# Patient Record
Sex: FEMALE | Race: WHITE | NOT HISPANIC OR LATINO | URBAN - METROPOLITAN AREA
[De-identification: names, ages, dates, MRNs, and addresses within clinical notes are randomized per-mention and may not be internally consistent; named-entity substitution may affect disease eponyms.]

---

## 2024-06-28 ENCOUNTER — OUTPATIENT (OUTPATIENT)
Dept: OUTPATIENT SERVICES | Facility: HOSPITAL | Age: 49
LOS: 1 days | End: 2024-06-28
Payer: COMMERCIAL

## 2024-06-28 VITALS
HEIGHT: 63.5 IN | DIASTOLIC BLOOD PRESSURE: 60 MMHG | OXYGEN SATURATION: 98 % | WEIGHT: 141.32 LBS | SYSTOLIC BLOOD PRESSURE: 100 MMHG | TEMPERATURE: 98 F | HEART RATE: 57 BPM | RESPIRATION RATE: 20 BRPM

## 2024-06-28 DIAGNOSIS — Z86.39 PERSONAL HISTORY OF OTHER ENDOCRINE, NUTRITIONAL AND METABOLIC DISEASE: ICD-10-CM

## 2024-06-28 DIAGNOSIS — Z13.89 ENCOUNTER FOR SCREENING FOR OTHER DISORDER: ICD-10-CM

## 2024-06-28 DIAGNOSIS — Z90.89 ACQUIRED ABSENCE OF OTHER ORGANS: Chronic | ICD-10-CM

## 2024-06-28 DIAGNOSIS — Z01.818 ENCOUNTER FOR OTHER PREPROCEDURAL EXAMINATION: ICD-10-CM

## 2024-06-28 DIAGNOSIS — D28.0 BENIGN NEOPLASM OF VULVA: ICD-10-CM

## 2024-06-28 DIAGNOSIS — Z98.891 HISTORY OF UTERINE SCAR FROM PREVIOUS SURGERY: Chronic | ICD-10-CM

## 2024-06-28 DIAGNOSIS — Z98.890 OTHER SPECIFIED POSTPROCEDURAL STATES: Chronic | ICD-10-CM

## 2024-06-28 DIAGNOSIS — Z29.9 ENCOUNTER FOR PROPHYLACTIC MEASURES, UNSPECIFIED: ICD-10-CM

## 2024-06-28 LAB
A1C WITH ESTIMATED AVERAGE GLUCOSE RESULT: 5.2 % — SIGNIFICANT CHANGE UP (ref 4–5.6)
ANION GAP SERPL CALC-SCNC: 11 MMOL/L — SIGNIFICANT CHANGE UP (ref 5–17)
APTT BLD: 33.3 SEC — SIGNIFICANT CHANGE UP (ref 24.5–35.6)
BASOPHILS # BLD AUTO: 0.04 K/UL — SIGNIFICANT CHANGE UP (ref 0–0.2)
BASOPHILS NFR BLD AUTO: 0.5 % — SIGNIFICANT CHANGE UP (ref 0–2)
BUN SERPL-MCNC: 18.9 MG/DL — SIGNIFICANT CHANGE UP (ref 8–20)
CALCIUM SERPL-MCNC: 9.3 MG/DL — SIGNIFICANT CHANGE UP (ref 8.4–10.5)
CHLORIDE SERPL-SCNC: 98 MMOL/L — SIGNIFICANT CHANGE UP (ref 96–108)
CO2 SERPL-SCNC: 27 MMOL/L — SIGNIFICANT CHANGE UP (ref 22–29)
CREAT SERPL-MCNC: 0.8 MG/DL — SIGNIFICANT CHANGE UP (ref 0.5–1.3)
EGFR: 91 ML/MIN/1.73M2 — SIGNIFICANT CHANGE UP
EOSINOPHIL # BLD AUTO: 0.31 K/UL — SIGNIFICANT CHANGE UP (ref 0–0.5)
EOSINOPHIL NFR BLD AUTO: 3.6 % — SIGNIFICANT CHANGE UP (ref 0–6)
ESTIMATED AVERAGE GLUCOSE: 103 MG/DL — SIGNIFICANT CHANGE UP (ref 68–114)
GLUCOSE SERPL-MCNC: 91 MG/DL — SIGNIFICANT CHANGE UP (ref 70–99)
HCG SERPL-ACNC: <4 MIU/ML — SIGNIFICANT CHANGE UP
HCT VFR BLD CALC: 42.1 % — SIGNIFICANT CHANGE UP (ref 34.5–45)
HGB BLD-MCNC: 13.6 G/DL — SIGNIFICANT CHANGE UP (ref 11.5–15.5)
IMM GRANULOCYTES NFR BLD AUTO: 0.3 % — SIGNIFICANT CHANGE UP (ref 0–0.9)
INR BLD: 0.93 RATIO — SIGNIFICANT CHANGE UP (ref 0.85–1.18)
LYMPHOCYTES # BLD AUTO: 1.87 K/UL — SIGNIFICANT CHANGE UP (ref 1–3.3)
LYMPHOCYTES # BLD AUTO: 21.7 % — SIGNIFICANT CHANGE UP (ref 13–44)
MCHC RBC-ENTMCNC: 30.5 PG — SIGNIFICANT CHANGE UP (ref 27–34)
MCHC RBC-ENTMCNC: 32.3 GM/DL — SIGNIFICANT CHANGE UP (ref 32–36)
MCV RBC AUTO: 94.4 FL — SIGNIFICANT CHANGE UP (ref 80–100)
MONOCYTES # BLD AUTO: 0.74 K/UL — SIGNIFICANT CHANGE UP (ref 0–0.9)
MONOCYTES NFR BLD AUTO: 8.6 % — SIGNIFICANT CHANGE UP (ref 2–14)
NEUTROPHILS # BLD AUTO: 5.62 K/UL — SIGNIFICANT CHANGE UP (ref 1.8–7.4)
NEUTROPHILS NFR BLD AUTO: 65.3 % — SIGNIFICANT CHANGE UP (ref 43–77)
PLATELET # BLD AUTO: 300 K/UL — SIGNIFICANT CHANGE UP (ref 150–400)
POTASSIUM SERPL-MCNC: 4.2 MMOL/L — SIGNIFICANT CHANGE UP (ref 3.5–5.3)
POTASSIUM SERPL-SCNC: 4.2 MMOL/L — SIGNIFICANT CHANGE UP (ref 3.5–5.3)
PROTHROM AB SERPL-ACNC: 10.3 SEC — SIGNIFICANT CHANGE UP (ref 9.5–13)
RBC # BLD: 4.46 M/UL — SIGNIFICANT CHANGE UP (ref 3.8–5.2)
RBC # FLD: 13.3 % — SIGNIFICANT CHANGE UP (ref 10.3–14.5)
SODIUM SERPL-SCNC: 136 MMOL/L — SIGNIFICANT CHANGE UP (ref 135–145)
T3 SERPL-MCNC: 74 NG/DL — LOW (ref 80–200)
T4 AB SER-ACNC: 9.3 UG/DL — SIGNIFICANT CHANGE UP (ref 4.5–12)
TSH SERPL-MCNC: 0.32 UIU/ML — SIGNIFICANT CHANGE UP (ref 0.27–4.2)
WBC # BLD: 8.61 K/UL — SIGNIFICANT CHANGE UP (ref 3.8–10.5)
WBC # FLD AUTO: 8.61 K/UL — SIGNIFICANT CHANGE UP (ref 3.8–10.5)

## 2024-06-28 PROCEDURE — 84443 ASSAY THYROID STIM HORMONE: CPT

## 2024-06-28 PROCEDURE — G0463: CPT

## 2024-06-28 PROCEDURE — 93010 ELECTROCARDIOGRAM REPORT: CPT

## 2024-06-28 PROCEDURE — 36415 COLL VENOUS BLD VENIPUNCTURE: CPT

## 2024-06-28 PROCEDURE — 80048 BASIC METABOLIC PNL TOTAL CA: CPT

## 2024-06-28 PROCEDURE — 84480 ASSAY TRIIODOTHYRONINE (T3): CPT

## 2024-06-28 PROCEDURE — 93005 ELECTROCARDIOGRAM TRACING: CPT

## 2024-06-28 PROCEDURE — 84436 ASSAY OF TOTAL THYROXINE: CPT

## 2024-06-28 PROCEDURE — 85025 COMPLETE CBC W/AUTO DIFF WBC: CPT

## 2024-06-28 PROCEDURE — 84702 CHORIONIC GONADOTROPIN TEST: CPT

## 2024-06-28 PROCEDURE — 86803 HEPATITIS C AB TEST: CPT

## 2024-06-28 PROCEDURE — 85730 THROMBOPLASTIN TIME PARTIAL: CPT

## 2024-06-28 PROCEDURE — 83036 HEMOGLOBIN GLYCOSYLATED A1C: CPT

## 2024-06-28 PROCEDURE — 85610 PROTHROMBIN TIME: CPT

## 2024-06-28 RX ORDER — SERTRALINE HYDROCHLORIDE 100 MG/1
1 TABLET, FILM COATED ORAL
Refills: 0 | DISCHARGE

## 2024-06-28 RX ORDER — ALPRAZOLAM 2 MG/1
1 TABLET ORAL
Refills: 0 | DISCHARGE

## 2024-06-28 RX ORDER — OMEPRAZOLE 10 MG/1
1 CAPSULE, DELAYED RELEASE ORAL
Refills: 0 | DISCHARGE

## 2024-06-28 RX ORDER — MESALAMINE 500 MG/1
1 CAPSULE ORAL
Refills: 0 | DISCHARGE

## 2024-06-28 RX ORDER — CEFAZOLIN 10 G/1
2000 INJECTION, POWDER, FOR SOLUTION INTRAVENOUS ONCE
Refills: 0 | Status: DISCONTINUED | OUTPATIENT
Start: 2024-06-28 | End: 2024-06-28

## 2024-06-28 RX ORDER — ACETAMINOPHEN 325 MG
975 TABLET ORAL ONCE
Refills: 0 | Status: DISCONTINUED | OUTPATIENT
Start: 2024-06-28 | End: 2024-06-28

## 2024-06-28 RX ORDER — LEVOTHYROXINE SODIUM 25 MCG
1 TABLET ORAL
Refills: 0 | DISCHARGE

## 2024-06-28 RX ORDER — CELECOXIB 100 MG/1
400 CAPSULE ORAL ONCE
Refills: 0 | Status: DISCONTINUED | OUTPATIENT
Start: 2024-06-28 | End: 2024-06-28

## 2024-06-28 RX ORDER — SODIUM CHLORIDE 0.9 % (FLUSH) 0.9 %
3 SYRINGE (ML) INJECTION EVERY 8 HOURS
Refills: 0 | Status: DISCONTINUED | OUTPATIENT
Start: 2024-06-28 | End: 2024-06-28

## 2024-06-28 RX ORDER — BALSALAZIDE DISODIUM 750 MG/1
3 CAPSULE ORAL
Refills: 0 | DISCHARGE

## 2024-06-29 LAB
HCV AB S/CO SERPL IA: 0.07 S/CO — SIGNIFICANT CHANGE UP (ref 0–0.99)
HCV AB SERPL-IMP: SIGNIFICANT CHANGE UP

## 2024-07-17 PROBLEM — K21.9 GASTRO-ESOPHAGEAL REFLUX DISEASE WITHOUT ESOPHAGITIS: Chronic | Status: ACTIVE | Noted: 2024-06-28

## 2024-07-29 PROBLEM — Z87.19 PERSONAL HISTORY OF OTHER DISEASES OF THE DIGESTIVE SYSTEM: Chronic | Status: ACTIVE | Noted: 2024-06-28

## 2024-07-29 PROBLEM — Z86.39 PERSONAL HISTORY OF OTHER ENDOCRINE, NUTRITIONAL AND METABOLIC DISEASE: Chronic | Status: ACTIVE | Noted: 2024-06-28

## 2024-08-01 ENCOUNTER — OUTPATIENT (OUTPATIENT)
Dept: OUTPATIENT SERVICES | Facility: HOSPITAL | Age: 49
LOS: 1 days | End: 2024-08-01
Payer: COMMERCIAL

## 2024-08-01 VITALS
HEIGHT: 64 IN | SYSTOLIC BLOOD PRESSURE: 104 MMHG | HEART RATE: 53 BPM | OXYGEN SATURATION: 97 % | TEMPERATURE: 97 F | DIASTOLIC BLOOD PRESSURE: 60 MMHG | RESPIRATION RATE: 18 BRPM | WEIGHT: 142.42 LBS

## 2024-08-01 DIAGNOSIS — D28.0 BENIGN NEOPLASM OF VULVA: ICD-10-CM

## 2024-08-01 DIAGNOSIS — Z13.89 ENCOUNTER FOR SCREENING FOR OTHER DISORDER: ICD-10-CM

## 2024-08-01 DIAGNOSIS — Z98.891 HISTORY OF UTERINE SCAR FROM PREVIOUS SURGERY: Chronic | ICD-10-CM

## 2024-08-01 DIAGNOSIS — Z98.890 OTHER SPECIFIED POSTPROCEDURAL STATES: Chronic | ICD-10-CM

## 2024-08-01 DIAGNOSIS — Z01.818 ENCOUNTER FOR OTHER PREPROCEDURAL EXAMINATION: ICD-10-CM

## 2024-08-01 DIAGNOSIS — Z29.9 ENCOUNTER FOR PROPHYLACTIC MEASURES, UNSPECIFIED: ICD-10-CM

## 2024-08-01 DIAGNOSIS — Z90.89 ACQUIRED ABSENCE OF OTHER ORGANS: Chronic | ICD-10-CM

## 2024-08-01 LAB
A1C WITH ESTIMATED AVERAGE GLUCOSE RESULT: 5.1 % — SIGNIFICANT CHANGE UP (ref 4–5.6)
ANION GAP SERPL CALC-SCNC: 10 MMOL/L — SIGNIFICANT CHANGE UP (ref 5–17)
APTT BLD: 31.4 SEC — SIGNIFICANT CHANGE UP (ref 24.5–35.6)
BASOPHILS # BLD AUTO: 0.04 K/UL — SIGNIFICANT CHANGE UP (ref 0–0.2)
BASOPHILS NFR BLD AUTO: 0.6 % — SIGNIFICANT CHANGE UP (ref 0–2)
BLD GP AB SCN SERPL QL: SIGNIFICANT CHANGE UP
BUN SERPL-MCNC: 20.4 MG/DL — HIGH (ref 8–20)
CALCIUM SERPL-MCNC: 9.2 MG/DL — SIGNIFICANT CHANGE UP (ref 8.4–10.5)
CHLORIDE SERPL-SCNC: 101 MMOL/L — SIGNIFICANT CHANGE UP (ref 96–108)
CO2 SERPL-SCNC: 27 MMOL/L — SIGNIFICANT CHANGE UP (ref 22–29)
CREAT SERPL-MCNC: 0.82 MG/DL — SIGNIFICANT CHANGE UP (ref 0.5–1.3)
EGFR: 89 ML/MIN/1.73M2 — SIGNIFICANT CHANGE UP
EOSINOPHIL # BLD AUTO: 0.16 K/UL — SIGNIFICANT CHANGE UP (ref 0–0.5)
EOSINOPHIL NFR BLD AUTO: 2.3 % — SIGNIFICANT CHANGE UP (ref 0–6)
ESTIMATED AVERAGE GLUCOSE: 100 MG/DL — SIGNIFICANT CHANGE UP (ref 68–114)
GLUCOSE SERPL-MCNC: 85 MG/DL — SIGNIFICANT CHANGE UP (ref 70–99)
HCG SERPL-ACNC: <4 MIU/ML — SIGNIFICANT CHANGE UP
HCT VFR BLD CALC: 39.6 % — SIGNIFICANT CHANGE UP (ref 34.5–45)
HGB BLD-MCNC: 13.1 G/DL — SIGNIFICANT CHANGE UP (ref 11.5–15.5)
IMM GRANULOCYTES NFR BLD AUTO: 0.3 % — SIGNIFICANT CHANGE UP (ref 0–0.9)
INR BLD: 0.94 RATIO — SIGNIFICANT CHANGE UP (ref 0.85–1.18)
LYMPHOCYTES # BLD AUTO: 1.83 K/UL — SIGNIFICANT CHANGE UP (ref 1–3.3)
LYMPHOCYTES # BLD AUTO: 26.2 % — SIGNIFICANT CHANGE UP (ref 13–44)
MCHC RBC-ENTMCNC: 31.2 PG — SIGNIFICANT CHANGE UP (ref 27–34)
MCHC RBC-ENTMCNC: 33.1 GM/DL — SIGNIFICANT CHANGE UP (ref 32–36)
MCV RBC AUTO: 94.3 FL — SIGNIFICANT CHANGE UP (ref 80–100)
MONOCYTES # BLD AUTO: 0.55 K/UL — SIGNIFICANT CHANGE UP (ref 0–0.9)
MONOCYTES NFR BLD AUTO: 7.9 % — SIGNIFICANT CHANGE UP (ref 2–14)
NEUTROPHILS # BLD AUTO: 4.38 K/UL — SIGNIFICANT CHANGE UP (ref 1.8–7.4)
NEUTROPHILS NFR BLD AUTO: 62.7 % — SIGNIFICANT CHANGE UP (ref 43–77)
PLATELET # BLD AUTO: 260 K/UL — SIGNIFICANT CHANGE UP (ref 150–400)
POTASSIUM SERPL-MCNC: 4.5 MMOL/L — SIGNIFICANT CHANGE UP (ref 3.5–5.3)
POTASSIUM SERPL-SCNC: 4.5 MMOL/L — SIGNIFICANT CHANGE UP (ref 3.5–5.3)
PROTHROM AB SERPL-ACNC: 10.4 SEC — SIGNIFICANT CHANGE UP (ref 9.5–13)
RBC # BLD: 4.2 M/UL — SIGNIFICANT CHANGE UP (ref 3.8–5.2)
RBC # FLD: 13.4 % — SIGNIFICANT CHANGE UP (ref 10.3–14.5)
SODIUM SERPL-SCNC: 138 MMOL/L — SIGNIFICANT CHANGE UP (ref 135–145)
WBC # BLD: 6.98 K/UL — SIGNIFICANT CHANGE UP (ref 3.8–10.5)
WBC # FLD AUTO: 6.98 K/UL — SIGNIFICANT CHANGE UP (ref 3.8–10.5)

## 2024-08-01 PROCEDURE — 84436 ASSAY OF TOTAL THYROXINE: CPT

## 2024-08-01 PROCEDURE — G0463: CPT

## 2024-08-01 PROCEDURE — 85730 THROMBOPLASTIN TIME PARTIAL: CPT

## 2024-08-01 PROCEDURE — 84702 CHORIONIC GONADOTROPIN TEST: CPT

## 2024-08-01 PROCEDURE — 85610 PROTHROMBIN TIME: CPT

## 2024-08-01 PROCEDURE — 84443 ASSAY THYROID STIM HORMONE: CPT

## 2024-08-01 PROCEDURE — 86901 BLOOD TYPING SEROLOGIC RH(D): CPT

## 2024-08-01 PROCEDURE — 86900 BLOOD TYPING SEROLOGIC ABO: CPT

## 2024-08-01 PROCEDURE — 83036 HEMOGLOBIN GLYCOSYLATED A1C: CPT

## 2024-08-01 PROCEDURE — 86850 RBC ANTIBODY SCREEN: CPT

## 2024-08-01 PROCEDURE — 80048 BASIC METABOLIC PNL TOTAL CA: CPT

## 2024-08-01 PROCEDURE — 84480 ASSAY TRIIODOTHYRONINE (T3): CPT

## 2024-08-01 PROCEDURE — 36415 COLL VENOUS BLD VENIPUNCTURE: CPT

## 2024-08-01 PROCEDURE — 85025 COMPLETE CBC W/AUTO DIFF WBC: CPT

## 2024-08-01 NOTE — H&P PST ADULT - RESPIRATORY
clear to auscultation bilaterally/no wheezes/no rales/no rhonchi clear to auscultation bilaterally/no wheezes/no rales/no rhonchi/breath sounds equal/respirations non-labored

## 2024-08-01 NOTE — H&P PST ADULT - NSANTHOSAYNRD_GEN_A_CORE
No. ZBIGNIEW screening performed.  STOP BANG Legend: 0-2 = LOW Risk; 3-4 = INTERMEDIATE Risk; 5-8 = HIGH Risk

## 2024-08-01 NOTE — H&P PST ADULT - NSICDXFAMILYHX_GEN_ALL_CORE_FT
FAMILY HISTORY:  Mother  Still living? Unknown  Family history of Graves' disease, Age at diagnosis: Age Unknown  Family history of heart murmur, Age at diagnosis: Age Unknown    Sibling  Still living? Unknown  FH: CAD (coronary artery disease), Age at diagnosis: Age Unknown    Grandparent  Still living? Unknown  FH: myocardial infarction, Age at diagnosis: Age Unknown

## 2024-08-01 NOTE — H&P PST ADULT - HISTORY OF PRESENT ILLNESS
48 yo F   x2 LMP 2024 PMH of ulcerative colitis, rectal bleeding, GERD, Hashimoto's disease, anxiety, presents to PST. Patient states she felt a mass on her labia approximately 2024. She was seen by her GYN Dr Leonard for evaluation. Reports ultrasound and MRI performed, as per patient she was informed of a "ovarian cyst and endometriosis". She reports a "procedure" was performed in Dr Leonard's office and then she was referred to Dr Haji for further evaluation. Reports Hx of spotting in between menstrual cycles, states no spotting in between  cycle in the last 2-3 months. States in April or May she bled for 25 days, with some days lighter than others. Reports daily bloating, denies fevers, chills, pelvic or abdominal pain, unexplained weight loss/gain, or FHx of ovarian, uterine or cervical cancer. Scheduled for excision of mass right labia on 2024 with Dr Haji, pending medical clearance   50 yo F   x2 LMP 2024 PMH of ulcerative colitis, GERD, Hashimoto's disease, depression and anxiety, presents to PST. Patient states she felt a mass on her labia approximately 2024. She was seen by her GYN Dr Leonard for evaluation. Reports ultrasound and MRI performed, as per patient she was informed of a "ovarian cyst and endometriosis". She was referred to Dr Haji for further evaluation. Reports Hx of spotting in between menstrual cycles, states no spotting in between cycle in the last 2-3 months. States in April or May she bled for 25 days, with some days lighter than others. Reports daily bloating. Denies fevers, chills, pelvic or abdominal pain, unexplained weight loss or gain, or FHx of ovarian, uterine or cervical cancer. Scheduled for excision of mass right labia on 2024 with Dr Haji

## 2024-08-01 NOTE — H&P PST ADULT - CARDIOVASCULAR
negative regular rate and rhythm/S1 S2 present/no gallops/no rub/no murmur regular rate and rhythm/S1 S2 present/no gallops/no rub/no murmur/no JVD/no pedal edema/bradycardia

## 2024-08-01 NOTE — H&P PST ADULT - GASTROINTESTINAL
details… soft/nontender/nondistended/normal active bowel sounds soft/nontender/nondistended/normal active bowel sounds/no guarding/no masses palpable

## 2024-08-01 NOTE — H&P PST ADULT - NEUROLOGICAL
sensation intact/responds to verbal commands/cranial nerves intact/no spontaneous movement negative cranial nerves II-XII intact/sensation intact/responds to verbal commands/cranial nerves intact/no spontaneous movement

## 2024-08-01 NOTE — H&P PST ADULT - GENITOURINARY COMMENTS
h/o spotting in between cycles, pt states no spotting in between in the last 2-3 months, States in April or May bled for 25 days Hx of spotting in between cycles, states no spotting in the last 2-3 months, States in April or May bled for 25 days

## 2024-08-01 NOTE — H&P PST ADULT - MUSCULOSKELETAL
ROM intact/no calf tenderness/strength 5/5 bilateral upper extremities/strength 5/5 bilateral lower extremities negative

## 2024-08-01 NOTE — H&P PST ADULT - ASSESSMENT
50 yo F   x2 LMP 2024 PMH of ulcerative colitis, rectal bleeding, GERD, Hashimoto's disease, anxiety, presents to PST. Patient states she felt a mass on her labia approximately 2024. She was seen by her GYN Dr Leonard for evaluation. Reports ultrasound and MRI performed, as per patient she was informed of a "ovarian cyst and endometriosis". She reports a "procedure" was performed in Dr Leonard's office and then she was referred to Dr Haji for further evaluation. Reports Hx of spotting in between menstrual cycles, states no spotting in between  cycle in the last 2-3 months. States in April or May she bled for 25 days, with some days lighter than others. Reports daily bloating, denies fevers, chills, pelvic or abdominal pain, unexplained weight loss/gain, or FHx of ovarian, uterine or cervical cancer. Scheduled for excision of mass right labia on 2024 with Dr Haji, pending medical clearance    Written and oral ERP instructions given, pt verbalized understanding.     Patient was educated on preop preparation with written and verbal instructions. Pt was informed to obtain clearances >3 days before surgery. Pt will review medications with PCP. Pt was educated on NSAIDs, multivitamins and herbals that increase the risk of bleeding and need to be stopped 7 days before procedure. Pt verbalized understanding of the above.         OPIOID RISK TOOL    NAHID EACH BOX THAT APPLIES AND ADD TOTALS AT THE END    FAMILY HISTORY OF SUBSTANCE ABUSE                 FEMALE         MALE                                                Alcohol                             [  ]1 pt          [  ]3pts                                               Illegal Durgs                     [  ]2 pts        [  ]3pts                                               Rx Drugs                           [  ]4 pts        [  ]4 pts    PERSONAL HISTORY OF SUBSTANCE ABUSE                                                                                          Alcohol                             [  ]3 pts       [  ]3 pts                                               Illegal Drugs                     [  ]4 pts        [  ]4 pts                                               Rx Drugs                           [  ]5 pts        [  ]5 pts    AGE BETWEEN 16-45 YEARS                                      [  ]1 pt         [  ]1 pt    HISTORY OF PREADOLESCENT   SEXUAL ABUSE                                                             [  ]3 pts        [  ]0pts    PSYCHOLOGICAL DISEASE                     ADD, OCD, Bipolar, Schizophrenia        [  ]2 pts         [  ]2 pts                      Depression                                               [x  ]1 pt           [  ]1 pt           SCORING TOTAL   (add numbers and type here)              ( 1 )                                     A score of 3 or lower indicated LOW risk for future opioid abuse  A score of 4 to 7 indicated moderate risk for future opioid abuse  A score of 8 or higher indicates a high risk for opioid abuse    CAPRINI VTE 2.0 SCORE [CLOT updated 2019]    AGE RELATED RISK FACTORS                                                       MOBILITY RELATED FACTORS  [x ] Age 41-60 years                                            (1 Point)                    [ ] Bed rest                                                        (1 Point)  [ ] Age: 61-74 years                                           (2 Points)                  [ ] Plaster cast                                                   (2 Points)  [ ] Age= 75 years                                              (3 Points)                    [ ] Bed bound for more than 72 hours                 (2 Points)    DISEASE RELATED RISK FACTORS                                               GENDER SPECIFIC FACTORS  [ ] Edema in the lower extremities                       (1 Point)              [ ] Pregnancy                                                     (1 Point)  [ ] Varicose veins                                               (1 Point)                     [ ] Post-partum < 6 weeks                                   (1 Point)             [ ] BMI > 25 Kg/m2                                            (1 Point)                     [ ] Hormonal therapy  or oral contraception          (1 Point)                 [ ] Sepsis (in the previous month)                        (1 Point)               [ ] History of pregnancy complications                 (1 point)  [ ] Pneumonia or serious lung disease                                               [ ] Unexplained or recurrent                     (1 Point)           (in the previous month)                               (1 Point)  [ ] Abnormal pulmonary function test                     (1 Point)                 SURGERY RELATED RISK FACTORS  [ ] Acute myocardial infarction                              (1 Point)               [ ]  Section                                             (1 Point)  [ ] Congestive heart failure (in the previous month)  (1 Point)      [ ] Minor surgery                                                  (1 Point)   [ ] Inflammatory bowel disease                             (1 Point)               [ ] Arthroscopic surgery                                        (2 Points)  [ ] Central venous access                                      (2 Points)                [ x] General surgery lasting more than 45 minutes (2 points)  [ ] Malignancy- Present or previous                   (2 Points)                [ ] Elective arthroplasty                                         (5 points)    [ ] Stroke (in the previous month)                          (5 Points)                                                                                                                                                           HEMATOLOGY RELATED FACTORS                                                 TRAUMA RELATED RISK FACTORS  [ ] Prior episodes of VTE                                     (3 Points)                [ ] Fracture of the hip, pelvis, or leg                       (5 Points)  [ ] Positive family history for VTE                         (3 Points)             [ ] Acute spinal cord injury (in the previous month)  (5 Points)  [ ] Prothrombin 07848 A                                     (3 Points)               [ ] Paralysis  (less than 1 month)                             (5 Points)  [ ] Factor V Leiden                                             (3 Points)                  [ ] Multiple Trauma within 1 month                        (5 Points)  [ ] Lupus anticoagulants                                     (3 Points)                                                           [ ] Anticardiolipin antibodies                               (3 Points)                                                       [ ] High homocysteine in the blood                      (3 Points)                                             [ ] Other congenital or acquired thrombophilia      (3 Points)                                                [ ] Heparin induced thrombocytopenia                  (3 Points)                                     Total Score [     3     ] 50 yo F   x2 LMP 2024 PMH of ulcerative colitis, GERD, Hashimoto's disease, depression and anxiety, presents to PST. Patient states she felt a mass on her labia approximately 2024. She was seen by her GYN Dr Leonard for evaluation. Reports ultrasound and MRI performed, as per patient she was informed of a "ovarian cyst and endometriosis". She was referred to Dr Haji for further evaluation. Reports Hx of spotting in between menstrual cycles, states no spotting in between cycle in the last 2-3 months. States in April or May she bled for 25 days, with some days lighter than others. Reports daily bloating. Denies fevers, chills, pelvic or abdominal pain, unexplained weight loss or gain, or FHx of ovarian, uterine or cervical cancer. Scheduled for excision of mass right labia on 2024 with Dr Haji, pending medical clearance per surgeon's request    Written and oral ERP instructions given, pt verbalized understanding.     Patient was educated on preop preparation with written and verbal instructions. Pt was informed to obtain medical clearances >3 days before surgery. Pt was educated on NSAIDs, multivitamins and herbals that increase the risk of bleeding and need to be stopped 7 days before procedure. Pt verbalized understanding of the above.     OPIOID RISK TOOL    NAHID EACH BOX THAT APPLIES AND ADD TOTALS AT THE END    FAMILY HISTORY OF SUBSTANCE ABUSE                 FEMALE         MALE                                                Alcohol                             [  ]1 pt          [  ]3pts                                               Illegal Drugs                     [  ]2 pts        [  ]3pts                                               Rx Drugs                           [  ]4 pts        [  ]4 pts    PERSONAL HISTORY OF SUBSTANCE ABUSE                                                                                          Alcohol                             [  ]3 pts       [  ]3 pts                                               Illegal Drugs                     [  ]4 pts        [  ]4 pts                                               Rx Drugs                           [  ]5 pts        [  ]5 pts    AGE BETWEEN 16-45 YEARS                                      [  ]1 pt         [  ]1 pt    HISTORY OF PREADOLESCENT   SEXUAL ABUSE                                                             [  ]3 pts        [  ]0pts    PSYCHOLOGICAL DISEASE                     ADD, OCD, Bipolar, Schizophrenia        [  ]2 pts         [  ]2 pts                      Depression                                               [x  ]1 pt           [  ]1 pt           SCORING TOTAL   (add numbers and type here)              ( 1 )                                     A score of 3 or lower indicated LOW risk for future opioid abuse  A score of 4 to 7 indicated moderate risk for future opioid abuse  A score of 8 or higher indicates a high risk for opioid abuse    CAPRINI VTE 2.0 SCORE [CLOT updated 2019]    AGE RELATED RISK FACTORS                                                       MOBILITY RELATED FACTORS  [x ] Age 41-60 years                                            (1 Point)                    [ ] Bed rest                                                        (1 Point)  [ ] Age: 61-74 years                                           (2 Points)                  [ ] Plaster cast                                                   (2 Points)  [ ] Age= 75 years                                              (3 Points)                    [ ] Bed bound for more than 72 hours                 (2 Points)    DISEASE RELATED RISK FACTORS                                               GENDER SPECIFIC FACTORS  [ ] Edema in the lower extremities                       (1 Point)              [ ] Pregnancy                                                     (1 Point)  [ ] Varicose veins                                               (1 Point)                     [ ] Post-partum < 6 weeks                                   (1 Point)             [ ] BMI > 25 Kg/m2                                            (1 Point)                     [ ] Hormonal therapy  or oral contraception          (1 Point)                 [ ] Sepsis (in the previous month)                        (1 Point)               [ ] History of pregnancy complications                 (1 point)  [ ] Pneumonia or serious lung disease                                               [ ] Unexplained or recurrent                     (1 Point)           (in the previous month)                               (1 Point)  [ ] Abnormal pulmonary function test                     (1 Point)                 SURGERY RELATED RISK FACTORS  [ ] Acute myocardial infarction                              (1 Point)               [ ]  Section                                             (1 Point)  [ ] Congestive heart failure (in the previous month)  (1 Point)      [ ] Minor surgery                                                  (1 Point)   [ ] Inflammatory bowel disease                             (1 Point)               [ ] Arthroscopic surgery                                        (2 Points)  [ ] Central venous access                                      (2 Points)                [ x] General surgery lasting more than 45 minutes (2 points)  [ ] Malignancy- Present or previous                   (2 Points)                [ ] Elective arthroplasty                                         (5 points)    [ ] Stroke (in the previous month)                          (5 Points)                                                                                                                                                           HEMATOLOGY RELATED FACTORS                                                 TRAUMA RELATED RISK FACTORS  [ ] Prior episodes of VTE                                     (3 Points)                [ ] Fracture of the hip, pelvis, or leg                       (5 Points)  [ ] Positive family history for VTE                         (3 Points)             [ ] Acute spinal cord injury (in the previous month)  (5 Points)  [ ] Prothrombin 78899 A                                     (3 Points)               [ ] Paralysis  (less than 1 month)                             (5 Points)  [ ] Factor V Leiden                                             (3 Points)                  [ ] Multiple Trauma within 1 month                        (5 Points)  [ ] Lupus anticoagulants                                     (3 Points)                                                           [ ] Anticardiolipin antibodies                               (3 Points)                                                       [ ] High homocysteine in the blood                      (3 Points)                                             [ ] Other congenital or acquired thrombophilia      (3 Points)                                                [ ] Heparin induced thrombocytopenia                  (3 Points)                                     Total Score [     3     ]

## 2024-08-01 NOTE — H&P PST ADULT - NSICDXPASTMEDICALHX_GEN_ALL_CORE_FT
PAST MEDICAL HISTORY:  Acid reflux     Anxiety     H/O Hashimoto thyroiditis     H/O ulcerative colitis     History of rectal bleeding      PAST MEDICAL HISTORY:  Acid reflux     Anxiety and depression     Benign neoplasm of vulva     H/O Hashimoto thyroiditis     H/O ulcerative colitis     History of rectal bleeding

## 2024-08-01 NOTE — H&P PST ADULT - NSICDXPASTSURGICALHX_GEN_ALL_CORE_FT
PAST SURGICAL HISTORY:  History of 2  sections     History of hernia repair     History of tonsillectomy

## 2024-08-01 NOTE — H&P PST ADULT - NEGATIVE GENERAL GENITOURINARY SYMPTOMS
no hematuria/no renal colic/no flank pain L/no flank pain R/no incontinence/no dysuria/normal urinary frequency no hematuria/no flank pain L/no flank pain R/no bladder infections/no incontinence/no dysuria

## 2024-08-01 NOTE — H&P PST ADULT - PRO TOBACCO TYPE
social smoker on average 1 cigarette a week quit 1 year ago, former 1ppd x 10 years quit 10 years ago

## 2024-08-01 NOTE — H&P PST ADULT - NEGATIVE OPHTHALMOLOGIC SYMPTOMS
no diplopia/no photophobia/no lacrimation L/no lacrimation R/no blurred vision L/no blurred vision R no diplopia/no photophobia/no blurred vision L/no blurred vision R

## 2024-08-01 NOTE — H&P PST ADULT - GASTROINTESTINAL COMMENTS
ulcerative colitis,  acid reflux reflux, h/o rectal bleeding , no active rectal bleeding, reports bloating ulcerative colitis

## 2024-08-02 PROBLEM — F41.9 ANXIETY DISORDER, UNSPECIFIED: Chronic | Status: INACTIVE | Noted: 2024-06-28 | Resolved: 2024-08-01

## 2024-08-02 LAB
T3 SERPL-MCNC: 82 NG/DL — SIGNIFICANT CHANGE UP (ref 80–200)
T4 AB SER-ACNC: 9.4 UG/DL — SIGNIFICANT CHANGE UP (ref 4.5–12)
TSH SERPL-MCNC: 0.23 UIU/ML — LOW (ref 0.27–4.2)

## 2024-08-08 ENCOUNTER — OUTPATIENT (OUTPATIENT)
Dept: OUTPATIENT SERVICES | Facility: HOSPITAL | Age: 49
LOS: 1 days | End: 2024-08-08
Payer: COMMERCIAL

## 2024-08-08 DIAGNOSIS — Z98.891 HISTORY OF UTERINE SCAR FROM PREVIOUS SURGERY: Chronic | ICD-10-CM

## 2024-08-08 DIAGNOSIS — Z90.89 ACQUIRED ABSENCE OF OTHER ORGANS: Chronic | ICD-10-CM

## 2024-08-08 DIAGNOSIS — Z98.890 OTHER SPECIFIED POSTPROCEDURAL STATES: Chronic | ICD-10-CM

## 2024-08-09 PROCEDURE — 11423 EXC H-F-NK-SP B9+MARG 2.1-3: CPT

## 2024-08-09 PROCEDURE — 88342 IMHCHEM/IMCYTCHM 1ST ANTB: CPT

## 2024-08-09 PROCEDURE — 85025 COMPLETE CBC W/AUTO DIFF WBC: CPT

## 2024-08-09 PROCEDURE — 88341 IMHCHEM/IMCYTCHM EA ADD ANTB: CPT

## 2024-08-09 PROCEDURE — C9399: CPT

## 2024-08-09 PROCEDURE — 84100 ASSAY OF PHOSPHORUS: CPT

## 2024-08-09 PROCEDURE — 58662 LAPAROSCOPY EXCISE LESIONS: CPT

## 2024-08-09 PROCEDURE — 83735 ASSAY OF MAGNESIUM: CPT

## 2024-08-09 PROCEDURE — 88304 TISSUE EXAM BY PATHOLOGIST: CPT

## 2024-08-09 PROCEDURE — 11421 EXC H-F-NK-SP B9+MARG 0.6-1: CPT

## 2024-08-09 PROCEDURE — 12041 INTMD RPR N-HF/GENIT 2.5CM/<: CPT

## 2024-08-09 PROCEDURE — 36415 COLL VENOUS BLD VENIPUNCTURE: CPT

## 2024-08-09 PROCEDURE — 80048 BASIC METABOLIC PNL TOTAL CA: CPT

## 2024-08-09 PROCEDURE — S2900: CPT

## 2024-08-09 PROCEDURE — 85027 COMPLETE CBC AUTOMATED: CPT

## 2024-08-09 PROCEDURE — 88305 TISSUE EXAM BY PATHOLOGIST: CPT

## 2024-08-09 PROCEDURE — 52000 CYSTOURETHROSCOPY: CPT

## 2024-08-14 DIAGNOSIS — D28.0 BENIGN NEOPLASM OF VULVA: ICD-10-CM

## 2024-08-22 PROBLEM — F41.9 ANXIETY DISORDER, UNSPECIFIED: Chronic | Status: ACTIVE | Noted: 2024-08-01

## 2024-08-22 PROBLEM — D28.0 BENIGN NEOPLASM OF VULVA: Chronic | Status: ACTIVE | Noted: 2024-08-01
